# Patient Record
Sex: FEMALE | Race: WHITE | NOT HISPANIC OR LATINO | URBAN - METROPOLITAN AREA
[De-identification: names, ages, dates, MRNs, and addresses within clinical notes are randomized per-mention and may not be internally consistent; named-entity substitution may affect disease eponyms.]

---
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
## 2023-03-10 NOTE — PROCEDURE: TREATMENT REGIMEN
- - -
Detail Level: Simple
Plan: Pt has completed 2 months of therapy. Pt tolerating well. Pt had labs drawn 5/28/19. Birth control form is Abstinence. Pt confirmed in ipledge. Te
Otc Regimen: Vanicream moisturizer\\nVaniply to lips\\nDr. Dans Cortibalm bid\\nElta MD SPF qd\\nArtificial tears PRN\\nCalcium and Vit D
Continue Regimen: Absorica 40mg daily

## 2023-05-08 ENCOUNTER — APPOINTMENT (RX ONLY)
Dept: URBAN - METROPOLITAN AREA CLINIC 71 | Facility: CLINIC | Age: 18
Setting detail: DERMATOLOGY
End: 2023-05-08

## 2023-05-08 DIAGNOSIS — Z71.89 OTHER SPECIFIED COUNSELING: ICD-10-CM

## 2023-05-08 DIAGNOSIS — D22 MELANOCYTIC NEVI: ICD-10-CM

## 2023-05-08 PROBLEM — D22.4 MELANOCYTIC NEVI OF SCALP AND NECK: Status: ACTIVE | Noted: 2023-05-08

## 2023-05-08 PROCEDURE — 99212 OFFICE O/P EST SF 10 MIN: CPT

## 2023-05-08 PROCEDURE — ? COSMETIC SHAVE REMOVAL (NO PATHOLOGY)

## 2023-05-08 PROCEDURE — ? COUNSELING

## 2023-05-08 ASSESSMENT — LOCATION SIMPLE DESCRIPTION DERM
LOCATION SIMPLE: SCALP
LOCATION SIMPLE: UPPER BACK

## 2023-05-08 ASSESSMENT — LOCATION ZONE DERM
LOCATION ZONE: SCALP
LOCATION ZONE: TRUNK

## 2023-05-08 ASSESSMENT — LOCATION DETAILED DESCRIPTION DERM
LOCATION DETAILED: INFERIOR THORACIC SPINE
LOCATION DETAILED: RIGHT SUPERIOR FRONTAL SCALP

## 2023-05-08 NOTE — PROCEDURE: COSMETIC SHAVE REMOVAL (NO PATHOLOGY)
Detail Level: Detailed
Price (Use Numbers Only, No Special Characters Or $): 199.00
X Size Of Lesion In Cm (Optional): 0
Anesthesia Type: 1% lidocaine with epinephrine
Hemostasis: Drysol
Wound Care: Petrolatum
Consent was obtained from the patient. The risks and benefits to therapy were discussed in detail. Specifically, the risks of infection, scarring, bleeding, prolonged wound healing, incomplete removal, allergy to anesthesia, nerve injury and recurrence were addressed. Prior to the procedure, the treatment site was clearly identified and confirmed by the patient. All components of Universal Protocol/PAUSE Rule completed.
Render Post-Care Instructions In Note?: no
Post-Care Instructions: I reviewed with the patient in detail post-care instructions. Patient is to keep the biopsy site dry overnight, and then apply bacitracin twice daily until healed. Patient may apply hydrogen peroxide soaks to remove any crusting.

## 2023-06-05 NOTE — HPI: MEDICATION (ISOTRETINOIN)
----- Message from Mandie Lopez MA sent at 6/5/2023  9:51 AM CDT -----  Regarding: refill on bc  Contact: patient  Patient calling asking for a refill on birth control. Estarylla.     Pharmacy - [facility] on [address] and [address] [facility]     [phone]    
How Severe Is It?: mild
Is This A New Presentation, Or A Follow-Up?: Follow Up Isotretinoin
Females Only: When Was Your Last Menstrual Period?: 06/08/19